# Patient Record
Sex: FEMALE
[De-identification: names, ages, dates, MRNs, and addresses within clinical notes are randomized per-mention and may not be internally consistent; named-entity substitution may affect disease eponyms.]

---

## 2020-08-18 PROBLEM — Z00.00 ENCOUNTER FOR PREVENTIVE HEALTH EXAMINATION: Status: ACTIVE | Noted: 2020-08-18

## 2020-08-21 ENCOUNTER — APPOINTMENT (OUTPATIENT)
Dept: ORTHOPEDIC SURGERY | Facility: CLINIC | Age: 75
End: 2020-08-21
Payer: MEDICAID

## 2020-08-21 VITALS
SYSTOLIC BLOOD PRESSURE: 129 MMHG | HEART RATE: 72 BPM | WEIGHT: 160 LBS | BODY MASS INDEX: 29.44 KG/M2 | HEIGHT: 62 IN | DIASTOLIC BLOOD PRESSURE: 76 MMHG

## 2020-08-21 DIAGNOSIS — M17.11 UNILATERAL PRIMARY OSTEOARTHRITIS, RIGHT KNEE: ICD-10-CM

## 2020-08-21 DIAGNOSIS — M25.551 PAIN IN RIGHT HIP: ICD-10-CM

## 2020-08-21 DIAGNOSIS — M25.561 PAIN IN RIGHT KNEE: ICD-10-CM

## 2020-08-21 PROCEDURE — 20610 DRAIN/INJ JOINT/BURSA W/O US: CPT | Mod: RT

## 2020-08-21 PROCEDURE — 73564 X-RAY EXAM KNEE 4 OR MORE: CPT | Mod: RT

## 2020-08-21 PROCEDURE — 99204 OFFICE O/P NEW MOD 45 MIN: CPT | Mod: 25

## 2020-08-21 RX ORDER — MELOXICAM 7.5 MG/1
7.5 TABLET ORAL
Qty: 60 | Refills: 0 | Status: ACTIVE | COMMUNITY
Start: 2020-08-21 | End: 1900-01-01

## 2020-08-21 NOTE — DISCUSSION/SUMMARY
[de-identified] : Patient is a 75-year-old female with advanced osteoarthritis of the right knee.  Conservative options were discussed.  A prescription for physical therapy was provided.  A prescription for meloxicam was provided.  Patient was informed of possible adverse effects of meloxicam and advised to call if any effects are noticed.  She will also confirm with her primary care doctor that is okay for her to take meloxicam.  Patient received a Depo-Medrol injection with lidocaine under sterile procedure to the right knee today in office and tolerated well.  Patient advised to ice and elevate when home.  Patient advised to monitor blood sugar closely for the next few days as she is diabetic.  Follow-up recommended as needed.

## 2020-08-21 NOTE — PROCEDURE
[de-identified] : Using sterile technique, 2cc of depomedrol 40mg/ml, 4cc of 1% plain lidocaine, and 2 cc 0.25% marcaine was drawn up into a sterile syringe. The right knee was then sterilely prepped with chlorhexidine. Ethyl chloride spray was used to anesthetize the skin and subQ tissue. The depomedrol/lidocaine/marcaine mixture was then injected into the knee joint in the anterolateral position. The patient tolerated the procedure well without difficulty. The patient was given instructions on the use of ice and anti-inflammatories post injection site soreness.

## 2020-08-21 NOTE — HISTORY OF PRESENT ILLNESS
[de-identified] : Ms. CONOR VIZCAINO is a 75 year old female presenting for evaluation of longstanding right knee pain.  Patient localizes the right knee pain to the medial and lateral aspect of the right knee.  She states is worse with weight-bear activity including walking any distance, rising from suggestion, and standing for any length of time.  Her knee became acutely more painful the last 2 weeks without any specific injury or trauma.  She admits to intermittent swelling and stiffness.  Patient states she has tried Tylenol and aleve with out significant relief, as well as Voltaren gel.  Patient has not had injections or physical therapy for the knee.  Patient denies any prior surgeries to the knee.\par Past medical history consistent of diabetes on metformin with an unknown A1c.  She denies any other pertinent past medical history.

## 2020-08-21 NOTE — PHYSICAL EXAM
[de-identified] : Right knee exam: Skin is within normal limits without erythema swelling or warmth.  Range of motion -5-120 degrees.  There is pain with extension and deep flexion.  There is tenderness to palpation of the medial and lateral joint lines.  With positive Hafsa's and negative Lachman's.  Sensation intact, 5/5 strength. [de-identified] : Multi body exam \par The patient appears well nourished and in no apparent distress. The patient is alert and oriented to person, place, and time. Affect and mood appear normal. The head is normocephalic and atraumatic. The eyes reveal normal sclera and extra ocular muscles are intact. The tongue is midline with no apparent lesions. Skin shows normal turgor with no evidence of eczema or psoriasis. No respiratory distress noted. Sensation grossly intact.\par   [de-identified] : X-ray 4 views right knee demonstrates advanced osteoarthritis of the right knee with medial joint space narrowing, patella joint space narrowing, osteophyte formation, and lateral chondrocalcinosis.

## 2020-08-28 RX ORDER — HYALURONATE SOD, CROSS-LINKED 30 MG/3 ML
30 SYRINGE (ML) INTRAARTICULAR
Qty: 1 | Refills: 0 | Status: ACTIVE | OUTPATIENT
Start: 2020-08-28

## 2020-09-04 ENCOUNTER — APPOINTMENT (OUTPATIENT)
Dept: ORTHOPEDIC SURGERY | Facility: CLINIC | Age: 75
End: 2020-09-04

## 2022-03-22 ENCOUNTER — APPOINTMENT (OUTPATIENT)
Dept: ORTHOPEDIC SURGERY | Facility: CLINIC | Age: 77
End: 2022-03-22
Payer: MEDICAID

## 2022-03-22 VITALS
WEIGHT: 150 LBS | SYSTOLIC BLOOD PRESSURE: 180 MMHG | HEART RATE: 76 BPM | HEIGHT: 62 IN | DIASTOLIC BLOOD PRESSURE: 92 MMHG | BODY MASS INDEX: 27.6 KG/M2

## 2022-03-22 DIAGNOSIS — M25.562 PAIN IN LEFT KNEE: ICD-10-CM

## 2022-03-22 DIAGNOSIS — M17.0 BILATERAL PRIMARY OSTEOARTHRITIS OF KNEE: ICD-10-CM

## 2022-03-22 PROCEDURE — 73564 X-RAY EXAM KNEE 4 OR MORE: CPT | Mod: RT

## 2022-03-22 PROCEDURE — 99214 OFFICE O/P EST MOD 30 MIN: CPT | Mod: 25

## 2022-03-22 PROCEDURE — 20610 DRAIN/INJ JOINT/BURSA W/O US: CPT | Mod: LT

## 2022-03-22 RX ORDER — MELOXICAM 15 MG/1
15 TABLET ORAL
Qty: 30 | Refills: 0 | Status: ACTIVE | COMMUNITY
Start: 2022-03-22 | End: 1900-01-01

## 2022-03-22 RX ORDER — HYALURONATE SODIUM 20 MG/2 ML
20 SYRINGE (ML) INTRAARTICULAR
Qty: 2 | Refills: 0 | Status: ACTIVE | OUTPATIENT
Start: 2022-03-22

## 2022-03-22 NOTE — HISTORY OF PRESENT ILLNESS
[de-identified] : 77-year-old female presenting with right worse than left knee pain has been going on for many years.  Patient states that a few years ago she had an injection in her knee and had relief of her pain however she was unable to follow-up due to the Covid pandemic.  States that she has severe pain in her knees and limited to walking 1 block.  Is unable to navigate stairs due to the pain.  Feels like the ankles are going to buckle on her.  Takes Advil and Tylenol without much relief of the pain.  Has not tried physical therapy.  Denies any hip pain or neurovascular compromise in the lower extremity.  Of note she is a diabetic unknown A1c.  Is not on insulin.

## 2022-03-22 NOTE — PHYSICAL EXAM
[de-identified] : GENERAL APPEARANCE: Well nourished and hydrated, pleasant, alert, and oriented x 3. Appears their stated age. \par HEENT: Normocephalic, extraocular eye motion intact. Nasal septum midline. Oral cavity clear. External auditory canal clear. \par RESPIRATORY: Breath sounds clear and audible in all lobes. No wheezing, No accessory muscle use.\par CARDIOVASCULAR: No apparent abnormalities. No lower leg edema. No varicosities. Pedal pulses are palpable.\par NEUROLOGIC: Sensation is normal, no muscle weakness in the upper or lower extremities.\par DERMATOLOGIC: No apparent skin lesions, moist, warm, no rash.\par SPINE: Cervical spine appears normal and moves freely; thoracic spine appears normal and moves freely; lumbosacral spine appears normal and moves freely, normal, nontender.\par MUSCULOSKELETAL: Hands, wrists, and elbows are normal and move freely, shoulders are normal and move freely. \par Psychiatric: Oriented to person, place, and time, insight and judgement were intact and the affect was normal. \par Musculoskeletal:. Right knee exam shows mild effusion, ROM is 0-1 20 degrees, no instability, no pain with Hafsa, medial and lateral joint line tenderness. \par 5/5 motor strength in bilateral lower extremities. Sensory: Intact in bilateral lower extremities. DTRs: Biceps, brachioradialis, triceps, patellar, ankle and plantar 2+ and symmetric bilaterally. Pulses: dorsalis pedis, posterior tibial, femoral, popliteal, and radial 2+ and symmetric bilaterally. \par Constitutional: Alert and in no acute distress, but well-appearing. \par Musculoskeletal:. Left knee exam shows moderate effusion, ROM is 0-1 15 degrees, no instability, no pain with Hafsa, medial and lateral joint line tenderness.  There is a 5 to 10 degree valgus deformity of the left knee.  No instability to varus valgus stress.  Knee is correctable.\par 5/5 motor strength in bilateral lower extremities. Sensory: Intact in bilateral lower extremities. DTRs: Biceps, brachioradialis, triceps, patellar, ankle and plantar 2+ and symmetric bilaterally. Pulses: dorsalis pedis, posterior tibial, femoral, popliteal, and radial 2+ and symmetric bilaterally. \par Constitutional: Alert and in no acute distress, but well-appearing.  [de-identified] : 4 views of the bilateral knees obtained the office today show no acute fracture or dislocation.  There is severe tricompartmental degenerative changes bilaterally with severe lateral joint space narrowing especially in the left knee consistent with Kellgren-Marques grade 4 changes and severe medial joint space narrowing in the right knee consistent with Kellgren-Marques grade 4 changes.  Chondrocalcinosis is noted bilaterally.

## 2022-03-22 NOTE — DISCUSSION/SUMMARY
[Medication Risks Reviewed] : Medication risks reviewed [Surgical risks reviewed] : Surgical risks reviewed [de-identified] : Patient is a 77-year-old female with severe bilateral knee osteoarthritis today today for initial evaluation.  Of note she has not yet tried conservative treatment I think that is warranted at this time.  I therefore gave her an injection of cortisone to the bilateral knees which she tolerated well.  I have also recommended physical therapy.  I recommended activity modification and weight loss.  I given a prescription for meloxicam 15 mg daily as needed for pain.  I have also ordered gel injections for her she would like to try those.  I will see her back after the gel injections for repeat evaluation.  All questions asked and answered.  She was advised she may be a candidate for total knee in the future.

## 2024-10-15 ENCOUNTER — APPOINTMENT (OUTPATIENT)
Dept: ORTHOPEDIC SURGERY | Facility: CLINIC | Age: 79
End: 2024-10-15

## 2024-10-15 VITALS — HEIGHT: 62 IN

## 2024-10-15 DIAGNOSIS — M17.0 BILATERAL PRIMARY OSTEOARTHRITIS OF KNEE: ICD-10-CM

## 2024-10-15 PROCEDURE — 73564 X-RAY EXAM KNEE 4 OR MORE: CPT | Mod: 50

## 2024-10-15 PROCEDURE — 99214 OFFICE O/P EST MOD 30 MIN: CPT | Mod: 25

## 2024-10-15 PROCEDURE — 20610 DRAIN/INJ JOINT/BURSA W/O US: CPT | Mod: 50

## 2025-01-15 ENCOUNTER — APPOINTMENT (OUTPATIENT)
Dept: ORTHOPEDIC SURGERY | Facility: CLINIC | Age: 80
End: 2025-01-15

## 2025-02-04 ENCOUNTER — APPOINTMENT (OUTPATIENT)
Dept: ORTHOPEDIC SURGERY | Facility: CLINIC | Age: 80
End: 2025-02-04
Payer: MEDICAID

## 2025-02-04 VITALS
HEART RATE: 91 BPM | SYSTOLIC BLOOD PRESSURE: 173 MMHG | BODY MASS INDEX: 25.95 KG/M2 | DIASTOLIC BLOOD PRESSURE: 74 MMHG | WEIGHT: 141 LBS | HEIGHT: 62 IN

## 2025-02-04 DIAGNOSIS — M17.0 BILATERAL PRIMARY OSTEOARTHRITIS OF KNEE: ICD-10-CM

## 2025-02-04 PROCEDURE — 20610 DRAIN/INJ JOINT/BURSA W/O US: CPT | Mod: 50

## 2025-02-04 PROCEDURE — 99214 OFFICE O/P EST MOD 30 MIN: CPT | Mod: 25
